# Patient Record
Sex: FEMALE | Race: WHITE | URBAN - NONMETROPOLITAN AREA
[De-identification: names, ages, dates, MRNs, and addresses within clinical notes are randomized per-mention and may not be internally consistent; named-entity substitution may affect disease eponyms.]

---

## 2023-02-10 ENCOUNTER — OFFICE VISIT (OUTPATIENT)
Dept: URGENT CARE | Facility: CLINIC | Age: 38
End: 2023-02-10
Payer: COMMERCIAL

## 2023-02-10 VITALS
BODY MASS INDEX: 21.22 KG/M2 | SYSTOLIC BLOOD PRESSURE: 128 MMHG | OXYGEN SATURATION: 97 % | WEIGHT: 140 LBS | TEMPERATURE: 97.5 F | HEART RATE: 107 BPM | HEIGHT: 68 IN | DIASTOLIC BLOOD PRESSURE: 80 MMHG | RESPIRATION RATE: 16 BRPM

## 2023-02-10 DIAGNOSIS — J01.90 ACUTE NON-RECURRENT SINUSITIS, UNSPECIFIED LOCATION: ICD-10-CM

## 2023-02-10 DIAGNOSIS — K11.20 PAROTITIS: ICD-10-CM

## 2023-02-10 PROCEDURE — 99203 OFFICE O/P NEW LOW 30 MIN: CPT | Performed by: NURSE PRACTITIONER

## 2023-02-10 RX ORDER — AMOXICILLIN AND CLAVULANATE POTASSIUM 875; 125 MG/1; MG/1
1 TABLET, FILM COATED ORAL 2 TIMES DAILY
Qty: 20 TABLET | Refills: 0 | Status: SHIPPED | OUTPATIENT
Start: 2023-02-10 | End: 2023-02-20

## 2023-02-10 RX ORDER — SPIRONOLACTONE 25 MG/1
75 TABLET ORAL DAILY
COMMUNITY
End: 2023-03-17

## 2023-02-10 RX ORDER — METHYLPREDNISOLONE 4 MG/1
TABLET ORAL
Qty: 1 EACH | Refills: 0 | Status: SHIPPED | OUTPATIENT
Start: 2023-02-10 | End: 2023-03-17

## 2023-02-10 ASSESSMENT — ENCOUNTER SYMPTOMS
NECK PAIN: 1
SINUS PRESSURE: 1
CONSTITUTIONAL NEGATIVE: 1
SINUS PAIN: 1
FEVER: 0
RESPIRATORY NEGATIVE: 1

## 2023-02-10 ASSESSMENT — VISUAL ACUITY: OU: 1

## 2023-02-10 NOTE — PROGRESS NOTES
"Subjective:     Karina Deleon is a 27 y.o. female who presents for Sinus Problem (X1mon POS infection, Neg covid test)       Sinus Problem  This is a new problem. Episode onset: 1 month ago. The problem has been gradually worsening since onset. Associated symptoms include congestion (Yellow nasal discharge), neck pain and sinus pressure. Treatments tried: Sinus rinses.     Tried Chloraseptic oral spray inside right cheek. Afterwards, experiencing swelling outside the cheek. Unsure if it was an allergic reaction. Took Benadryl; not improving.    Also reporting swollen lymph node at right neck. Hx of sinus issues.    Review of Systems   Constitutional: Negative.  Negative for fever and malaise/fatigue.   HENT:  Positive for congestion (Yellow nasal discharge), sinus pressure and sinus pain.    Respiratory: Negative.     Musculoskeletal:  Positive for neck pain.   All other systems reviewed and are negative.    Refer to HPI for additional details.    During this visit, appropriate PPE was worn, hand hygiene was performed, and the patient and any visitors were masked.    PMH:  has no past medical history on file.    MEDS:   Current Outpatient Medications:     spironolactone (ALDACTONE) 25 MG Tab, Take 75 mg by mouth every day., Disp: , Rfl:     amoxicillin-clavulanate (AUGMENTIN) 875-125 MG Tab, Take 1 Tablet by mouth 2 times a day for 10 days., Disp: 20 Tablet, Rfl: 0    methylPREDNISolone (MEDROL DOSEPAK) 4 MG Tablet Therapy Pack, Use as directed on package. May take all of Day 1 as a single dose (24 mg) when starting., Disp: 1 Each, Rfl: 0    ALLERGIES:   Allergies   Allergen Reactions    Seasonal      SURGHX: History reviewed. No pertinent surgical history.  SOCHX:      FH: Per HPI as applicable/pertinent.      Objective:     /80 (BP Location: Left arm, Patient Position: Sitting, BP Cuff Size: Adult)   Pulse (!) 107   Temp 36.4 °C (97.5 °F) (Temporal)   Resp 16   Ht 1.727 m (5' 8\")   Wt 63.5 kg (140 lb)  "  SpO2 97%   BMI 21.29 kg/m²     Physical Exam  Nursing note reviewed.   Constitutional:       General: She is not in acute distress.     Appearance: She is well-developed. She is not ill-appearing or toxic-appearing.   HENT:      Head:      Jaw: No trismus.        Nose: Congestion present.      Right Sinus: Maxillary sinus tenderness present.      Left Sinus: Maxillary sinus tenderness present.      Mouth/Throat:      Mouth: Mucous membranes are moist.      Pharynx: Oropharynx is clear.   Eyes:      General: Vision grossly intact.   Cardiovascular:      Rate and Rhythm: Tachycardia present.   Pulmonary:      Effort: Pulmonary effort is normal. No respiratory distress.      Comments: Phonation normal, speaks in full sentences, no audible wheeze or stridor   Musculoskeletal:         General: No deformity. Normal range of motion.   Lymphadenopathy:      Cervical: Cervical adenopathy (Right > left) present.   Skin:     General: Skin is warm and dry.      Coloration: Skin is not pale.   Neurological:      Mental Status: She is alert and oriented to person, place, and time.      Motor: No weakness.   Psychiatric:         Behavior: Behavior normal. Behavior is cooperative.       Assessment/Plan:     1. Acute non-recurrent sinusitis, unspecified location  - amoxicillin-clavulanate (AUGMENTIN) 875-125 MG Tab; Take 1 Tablet by mouth 2 times a day for 10 days.  Dispense: 20 Tablet; Refill: 0  - methylPREDNISolone (MEDROL DOSEPAK) 4 MG Tablet Therapy Pack; Use as directed on package. May take all of Day 1 as a single dose (24 mg) when starting.  Dispense: 1 Each; Refill: 0    2. Parotitis  - amoxicillin-clavulanate (AUGMENTIN) 875-125 MG Tab; Take 1 Tablet by mouth 2 times a day for 10 days.  Dispense: 20 Tablet; Refill: 0  - methylPREDNISolone (MEDROL DOSEPAK) 4 MG Tablet Therapy Pack; Use as directed on package. May take all of Day 1 as a single dose (24 mg) when starting.  Dispense: 1 Each; Refill: 0    Rx as above sent  electronically. Continue sinus rinse. OTC Flonase, antihistamine, APAP PRN. Warm/cool compress PRN.    Differential diagnosis, natural history, supportive care, over-the-counter symptom management per 's instructions, close monitoring, and indications for immediate follow-up discussed.     Vital signs stable, afebrile, no acute distress at this time. Warning signs reviewed. Return precautions discussed.     All questions answered. Patient agrees with the plan of care.

## 2023-03-09 ENCOUNTER — OFFICE VISIT (OUTPATIENT)
Dept: MEDICAL GROUP | Facility: PHYSICIAN GROUP | Age: 38
End: 2023-03-09
Payer: COMMERCIAL

## 2023-03-09 VITALS
HEART RATE: 82 BPM | WEIGHT: 139.33 LBS | SYSTOLIC BLOOD PRESSURE: 142 MMHG | HEIGHT: 68 IN | BODY MASS INDEX: 21.12 KG/M2 | OXYGEN SATURATION: 97 % | TEMPERATURE: 98 F | DIASTOLIC BLOOD PRESSURE: 106 MMHG | RESPIRATION RATE: 16 BRPM

## 2023-03-09 DIAGNOSIS — R09.81 SINUS CONGESTION: ICD-10-CM

## 2023-03-09 DIAGNOSIS — L70.9 ACNE, UNSPECIFIED ACNE TYPE: ICD-10-CM

## 2023-03-09 DIAGNOSIS — Z00.00 HEALTH CARE MAINTENANCE: ICD-10-CM

## 2023-03-09 DIAGNOSIS — I10 PRIMARY HYPERTENSION: ICD-10-CM

## 2023-03-09 DIAGNOSIS — N92.6 IRREGULAR MENSTRUAL CYCLE: ICD-10-CM

## 2023-03-09 DIAGNOSIS — F17.200 SMOKER: ICD-10-CM

## 2023-03-09 PROCEDURE — 99214 OFFICE O/P EST MOD 30 MIN: CPT | Performed by: STUDENT IN AN ORGANIZED HEALTH CARE EDUCATION/TRAINING PROGRAM

## 2023-03-09 RX ORDER — OXYMETAZOLINE HYDROCHLORIDE 0.05 G/100ML
2 SPRAY NASAL 2 TIMES DAILY
Qty: 1.2 ML | Refills: 0 | Status: SHIPPED | OUTPATIENT
Start: 2023-03-09 | End: 2023-03-12

## 2023-03-09 RX ORDER — SPIRONOLACTONE 25 MG/1
75 TABLET ORAL DAILY
Qty: 90 TABLET | Refills: 1 | Status: SHIPPED | OUTPATIENT
Start: 2023-03-09 | End: 2023-05-08

## 2023-03-09 RX ORDER — METHYLPREDNISOLONE 4 MG/1
TABLET ORAL
COMMUNITY
Start: 2023-02-10 | End: 2023-03-09

## 2023-03-09 RX ORDER — AMOXICILLIN AND CLAVULANATE POTASSIUM 875; 125 MG/1; MG/1
TABLET, FILM COATED ORAL
COMMUNITY
Start: 2023-02-10 | End: 2023-03-09

## 2023-03-09 ASSESSMENT — PATIENT HEALTH QUESTIONNAIRE - PHQ9: CLINICAL INTERPRETATION OF PHQ2 SCORE: 0

## 2023-03-09 NOTE — ASSESSMENT & PLAN NOTE
Patient reports she is not motivated yet to quit smoking.  She has tried Chantix in the past which did help the cravings but had given her mental side effects.  Advised to return to care if she would like to discuss treatment options.

## 2023-03-09 NOTE — ASSESSMENT & PLAN NOTE
Advised to check blood pressures daily twice a day and return to care next week  Continue spironolactone 75 mg daily, refilled  Labs ordered  EKG at next visit    Chronic condition not well controlled, studies ordered

## 2023-03-09 NOTE — PROGRESS NOTES
Subjective:   HISTORY OF THE PRESENT ILLNESS: Patient is a 37 y.o. female here today to establish care. His/her prior PCP was none.    Problem   Primary Hypertension    Patient reports a history of high blood pressure but never a formal diagnosis.  She is never taken medications for this in the past.  She takes spironolactone for acne and irregular menstrual cycles    She denies any chest pain, dyspnea exertion, palpitations, dizziness, edema, snoring     Smoker    25 years of 0.5 pack a day. Had chantix in the past but had side effects     Irregular Menstrual Cycle    Patient has history of irregular menstrual cycles, was given spironolactone by a online medical group physician which has now stabilized her cycles and has controlled her acne.     Health Care Maintenance   Sinus Congestion        Current Outpatient Medications Ordered in Epic   Medication Sig Dispense Refill    spironolactone (ALDACTONE) 25 MG Tab Take 3 Tablets by mouth every day. 90 Tablet 1    oxymetazoline (AFRIN NASAL SPRAY) 0.05 % Solution Administer 2 Sprays into affected nostril(S) 2 times a day for 3 days. 1.2 mL 0     No current Epic-ordered facility-administered medications on file.       Review of systems:  Per HPI    No past medical history on file.  No past surgical history on file.  Social History     Tobacco Use    Smoking status: Every Day     Packs/day: 0.50     Years: 25.00     Pack years: 12.50     Types: Cigarettes    Smokeless tobacco: Never   Vaping Use    Vaping Use: Never used   Substance Use Topics    Alcohol use: Yes     Alcohol/week: 3.0 - 3.6 oz     Types: 5 - 6 Standard drinks or equivalent per week    Drug use: Never      Family History   Problem Relation Age of Onset    Hypertension Mother     Heart Disease Father     Heart Disease Maternal Grandmother     Heart Disease Maternal Grandfather      Current Outpatient Medications   Medication Sig Dispense Refill    spironolactone (ALDACTONE) 25 MG Tab Take 3 Tablets by  "mouth every day. 90 Tablet 1    oxymetazoline (AFRIN NASAL SPRAY) 0.05 % Solution Administer 2 Sprays into affected nostril(S) 2 times a day for 3 days. 1.2 mL 0     No current facility-administered medications for this visit.       Allergies:  No Known Allergies    Allergies, past medical history, past surgical history, family history, social history reviewed and updated.    Objective:    BP (!) 142/106 (BP Location: Left arm, Patient Position: Sitting, BP Cuff Size: Adult)   Pulse 82   Temp 36.7 °C (98 °F) (Temporal)   Resp 16   Ht 1.727 m (5' 8\")   Wt 63.2 kg (139 lb 5.3 oz)   LMP 03/07/2023   SpO2 97%   BMI 21.19 kg/m²    Body mass index is 21.19 kg/m².    Physical exam:  General: Normal appearance, no acute distress, not ill-appearing  HEENT: EOM intact, conjunctiva normal limits, negative right/left eye discharge.  Sclera anicteric.  TM bilateral with bubbles behind the membranes, no erythema or discharge, gums slightly erythematous, poor dentition.  Cardiovascular: Normal rate and rhythm, no murmurs  Pulmonary: No respiratory distress, no wheezing, no rales, breath sounds normal.  Abdomen: Bowel sounds normal, flat, soft.  Musculoskeletal: No edema bilaterally  Skin: Warm, dry, no lesions  Neurological: No focal deficits, normal gait  Psychiatric: Mood within normal limits    Assessment/Plan:      Problem List Items Addressed This Visit       Primary hypertension     Advised to check blood pressures daily twice a day and return to care next week  Continue spironolactone 75 mg daily, refilled  Labs ordered  EKG at next visit    Chronic condition not well controlled, studies ordered         Relevant Medications    spironolactone (ALDACTONE) 25 MG Tab    Other Relevant Orders    Comp Metabolic Panel    CBC WITHOUT DIFFERENTIAL    TSH WITH REFLEX TO FT4    MICROALBUMIN CREAT RATIO URINE    Smoker     Patient reports she is not motivated yet to quit smoking.  She has tried Chantix in the past which did " help the cravings but had given her mental side effects.  Advised to return to care if she would like to discuss treatment options.         Relevant Orders    Lipid Profile    Irregular menstrual cycle     Continue spironolactone which has the added side effect of helping her blood pressure.  Patient reports that she is not on any birth control and is sexually active, reports her  will be getting a vasectomy and does not want to discuss birth control options         Health care maintenance     Patient here for a preventive medicine visit today and to establish care.  -Reviewed all past medical history, family history, social history    -Diet and exercise appropriate counseling given  -Social determinants of health reviewed  -Tobacco, alcohol, recreational drug use: discussed cigarette use.   -Cholesterol screening: Ordered  -Diabetes screening: Not indicated  -AAA Screening: Not indicated  - DEXA scan: Not indicated    Immunizations/Infectious disease:  STI screening: declines  Safe sex practices discusssed  HIV screening: delcines  Immunizations: Declines flu, tetanus, had COVID booster.  Discussed pneumonia vaccine at next visit    Cancer screenings:  Colorectal cancer screening: no fam hx of colon cancer  Cervical Cancer Screening: needs to be scheluded   Breast Cancer Screening:          ----BRCA SCREENING: FHS-7 score: no fam hx of breat or ovarian cancer    Ob-Gyn/ History:   /Para:   Hx of abnormal Pap smears: none  Gyn Surgery: No  Current Contraceptive Method: no birth control  Cycles are regular with spironolactone.          Sinus congestion     Month patient went to the urgent care for a sinus infection.  She received prednisone and Augmentin with improvement in her symptoms.  Since then she is noted that she is still having right-sided facial pain but much reduced swelling.  On exam it seems that her gums are somewhat erythematous and irritated.  Tympanic membrane show bubbles behind  the membranes bilaterally.  She may be still having some residual congestion however advised her to go see a dentist in order to investigate for periodontal disease.  Rx Afrin for decongestion, continue Esthela pot sinus irrigation.         Relevant Medications    oxymetazoline (AFRIN NASAL SPRAY) 0.05 % Solution     Other Visit Diagnoses       Acne, unspecified acne type        Relevant Medications    spironolactone (ALDACTONE) 25 MG Tab           Return in about 1 week (around 3/16/2023) for blood pressure.

## 2023-03-09 NOTE — ASSESSMENT & PLAN NOTE
Month patient went to the urgent care for a sinus infection.  She received prednisone and Augmentin with improvement in her symptoms.  Since then she is noted that she is still having right-sided facial pain but much reduced swelling.  On exam it seems that her gums are somewhat erythematous and irritated.  Tympanic membrane show bubbles behind the membranes bilaterally.  She may be still having some residual congestion however advised her to go see a dentist in order to investigate for periodontal disease.  Rx Afrin for decongestion, continue Esthela pot sinus irrigation.

## 2023-03-09 NOTE — ASSESSMENT & PLAN NOTE
Patient here for a preventive medicine visit today and to establish care.  -Reviewed all past medical history, family history, social history    -Diet and exercise appropriate counseling given  -Social determinants of health reviewed  -Tobacco, alcohol, recreational drug use: discussed cigarette use.   -Cholesterol screening: Ordered  -Diabetes screening: Not indicated  -AAA Screening: Not indicated  - DEXA scan: Not indicated    Immunizations/Infectious disease:  STI screening: declines  Safe sex practices discusssed  HIV screening: delcines  Immunizations: Declines flu, tetanus, had COVID booster.  Discussed pneumonia vaccine at next visit    Cancer screenings:  Colorectal cancer screening: no fam hx of colon cancer  Cervical Cancer Screening: needs to be scheluded   Breast Cancer Screening:          ----BRCA SCREENING: FHS-7 score: no fam hx of breat or ovarian cancer    Ob-Gyn/ History:   /Para:   Hx of abnormal Pap smears: none  Gyn Surgery: No  Current Contraceptive Method: no birth control  Cycles are regular with spironolactone.

## 2023-03-09 NOTE — ASSESSMENT & PLAN NOTE
Continue spironolactone which has the added side effect of helping her blood pressure.  Patient reports that she is not on any birth control and is sexually active, reports her  will be getting a vasectomy and does not want to discuss birth control options

## 2023-03-17 ENCOUNTER — OFFICE VISIT (OUTPATIENT)
Dept: MEDICAL GROUP | Facility: PHYSICIAN GROUP | Age: 38
End: 2023-03-17
Payer: COMMERCIAL

## 2023-03-17 VITALS
BODY MASS INDEX: 21.28 KG/M2 | DIASTOLIC BLOOD PRESSURE: 78 MMHG | HEIGHT: 68 IN | SYSTOLIC BLOOD PRESSURE: 138 MMHG | HEART RATE: 95 BPM | TEMPERATURE: 96.8 F | WEIGHT: 140.43 LBS | OXYGEN SATURATION: 97 %

## 2023-03-17 DIAGNOSIS — I10 PRIMARY HYPERTENSION: ICD-10-CM

## 2023-03-17 PROCEDURE — 93000 ELECTROCARDIOGRAM COMPLETE: CPT | Performed by: STUDENT IN AN ORGANIZED HEALTH CARE EDUCATION/TRAINING PROGRAM

## 2023-03-17 PROCEDURE — 99214 OFFICE O/P EST MOD 30 MIN: CPT | Performed by: STUDENT IN AN ORGANIZED HEALTH CARE EDUCATION/TRAINING PROGRAM

## 2023-03-17 RX ORDER — AMLODIPINE BESYLATE 5 MG/1
5 TABLET ORAL DAILY
Qty: 30 TABLET | Refills: 3 | Status: SHIPPED | OUTPATIENT
Start: 2023-03-17 | End: 2023-04-28

## 2023-03-17 NOTE — PROGRESS NOTES
HISTORY OF PRESENT ILLNESS: Karina is a pleasant 37 y.o. female, established patient who presents today to discuss medical problems as listed below:    #Hypertension  Patient was seen last visit for her high blood pressure.  She was started on her previous medications spironolactone at 75 mg.  She comes in today with a blood pressure log showing improved blood pressures however the diastolic numbers still in the 90s the majority of the time.  Denies any chest pain, dyspnea exertion, edema, palpitations, orthopnea.  She otherwise feels well    Current Outpatient Medications Ordered in Epic   Medication Sig Dispense Refill    amLODIPine (NORVASC) 5 MG Tab Take 1 Tablet by mouth every day. 30 Tablet 3    spironolactone (ALDACTONE) 25 MG Tab Take 3 Tablets by mouth every day. 90 Tablet 1     No current Epic-ordered facility-administered medications on file.       Review of systems:  Per HPI    History reviewed. No pertinent past medical history.  History reviewed. No pertinent surgical history.  Social History     Tobacco Use    Smoking status: Every Day     Packs/day: 0.50     Years: 25.00     Pack years: 12.50     Types: Cigarettes    Smokeless tobacco: Never   Vaping Use    Vaping Use: Never used   Substance Use Topics    Alcohol use: Yes     Alcohol/week: 3.0 - 3.6 oz     Types: 5 - 6 Standard drinks or equivalent per week    Drug use: Never      Family History   Problem Relation Age of Onset    Hypertension Mother     Heart Disease Father     Heart Disease Maternal Grandmother     Heart Disease Maternal Grandfather      Current Outpatient Medications   Medication Sig Dispense Refill    amLODIPine (NORVASC) 5 MG Tab Take 1 Tablet by mouth every day. 30 Tablet 3    spironolactone (ALDACTONE) 25 MG Tab Take 3 Tablets by mouth every day. 90 Tablet 1     No current facility-administered medications for this visit.       Allergies:  Allergies   Allergen Reactions    Seasonal        Allergies, past medical history,  "past surgical history, family history, social history reviewed and updated.    Objective:    /78 (BP Location: Right arm, Patient Position: Sitting, BP Cuff Size: Adult)   Pulse 95   Temp 36 °C (96.8 °F) (Temporal)   Ht 1.727 m (5' 8\")   Wt 63.7 kg (140 lb 6.9 oz)   LMP 03/07/2023   SpO2 97%   BMI 21.35 kg/m²    Body mass index is 21.35 kg/m².    Physical exam:  General: Normal appearance, no acute distress, not ill-appearing  HEENT: EOM intact, conjunctiva normal limits, negative right/left eye discharge.  Sclera anicteric  Cardiovascular: Normal rate and rhythm, no murmurs  Pulmonary: No respiratory distress, no wheezing, no rales, breath sounds normal.  Abdomen: Bowel sounds normal, flat, soft.  Musculoskeletal: No edema bilaterally  Skin: Warm, dry, no lesions  Neurological: No focal deficits, normal gait  Psychiatric: Mood within normal limits    Assessment/Plan:    Problem List Items Addressed This Visit       Primary hypertension     Blood pressures are still not well controlled of the diastolics are in the  range.  Currently on spironolactone 25 mg daily.  We will add a amlodipine 5 mg tablet.  Advised on side effects.  Chronic condition not well controlled with medical management    Baseline EKG done today: Normal sinus rate and rhythm, normal axis, no ST changes, no T wave inversions, intervals within normal limits.    Return to care 4 weeks         Relevant Medications    amLODIPine (NORVASC) 5 MG Tab    Other Relevant Orders    EKG - Clinic Performed (Completed)        Return in about 4 weeks (around 4/14/2023) for blood pressure.     "

## 2023-03-17 NOTE — ASSESSMENT & PLAN NOTE
Blood pressures are still not well controlled of the diastolics are in the  range.  Currently on spironolactone 25 mg daily.  We will add a amlodipine 5 mg tablet.  Advised on side effects.  Chronic condition not well controlled with medical management    Baseline EKG done today: Normal sinus rate and rhythm, normal axis, no ST changes, no T wave inversions, intervals within normal limits.    Return to care 4 weeks

## 2023-04-21 ENCOUNTER — OFFICE VISIT (OUTPATIENT)
Dept: MEDICAL GROUP | Facility: PHYSICIAN GROUP | Age: 38
End: 2023-04-21
Payer: COMMERCIAL

## 2023-04-21 ENCOUNTER — HOSPITAL ENCOUNTER (OUTPATIENT)
Facility: MEDICAL CENTER | Age: 38
End: 2023-04-21
Attending: NURSE PRACTITIONER
Payer: COMMERCIAL

## 2023-04-21 VITALS
OXYGEN SATURATION: 96 % | DIASTOLIC BLOOD PRESSURE: 80 MMHG | HEART RATE: 97 BPM | RESPIRATION RATE: 16 BRPM | BODY MASS INDEX: 20.92 KG/M2 | SYSTOLIC BLOOD PRESSURE: 124 MMHG | HEIGHT: 68 IN | WEIGHT: 138 LBS | TEMPERATURE: 97 F

## 2023-04-21 DIAGNOSIS — Z11.51 SCREENING FOR HPV (HUMAN PAPILLOMAVIRUS): ICD-10-CM

## 2023-04-21 DIAGNOSIS — Z12.4 SCREENING FOR CERVICAL CANCER: ICD-10-CM

## 2023-04-21 DIAGNOSIS — Z01.419 ENCOUNTER FOR GYNECOLOGICAL EXAMINATION: ICD-10-CM

## 2023-04-21 PROCEDURE — 87624 HPV HI-RISK TYP POOLED RSLT: CPT

## 2023-04-21 PROCEDURE — 99000 SPECIMEN HANDLING OFFICE-LAB: CPT | Performed by: NURSE PRACTITIONER

## 2023-04-21 PROCEDURE — 99395 PREV VISIT EST AGE 18-39: CPT | Performed by: NURSE PRACTITIONER

## 2023-04-21 PROCEDURE — 88175 CYTOPATH C/V AUTO FLUID REDO: CPT

## 2023-04-21 NOTE — PROGRESS NOTES
Subjective:   S:  Karina Deleon is a 37 y.o.,female who presents today for her annual Pap and GYN exam.  She is feeling well and denies any complaints.    A chaperone was offered to the patient during today's exam. Patient declined chaperone.    Ob-Gyn/ History:    Patient has GYN provider: no  /Para:  0/0  Last Pap Smear:  12-13 years ago .   Possible abnormal history of abnormal pap smears.  Treatment for abnormal Pap smear: none  Gyn Surgery:  None .  Current Contraceptive Method:  None.   IS currently sexually active.  Last menstrual period:  2023.    She is still having regular menses.  Periods regular. moderate bleeding.   Cramping is variable.   She does not take OTC analgesics for cramps.  No significant bloating/fluid retention, pelvic pain, or dyspareunia.   No vaginal discharge  Post-menopausal bleeding: NA  Urinary incontinence: None  Folate intake: None       Cancer screening  Colorectal Cancer Screening: NA    Lung Cancer Screening:  Too Young  Cervical Cancer Screening: completed today    Breast Cancer Screening: NA     Infectious disease screening/Immunizations  --STI Screening: Not interested   --Practices safe sex.  --HIV Screening: Not interested   --Hepatitis C Screening: Not interested     Her preventative health screens are up to date.        Patient Active Problem List    Diagnosis Date Noted    Primary hypertension 2023    Smoker 2023    Irregular menstrual cycle 2023    Health care maintenance 2023    Sinus congestion 2023       Current Outpatient Medications on File Prior to Visit   Medication Sig Dispense Refill    amLODIPine (NORVASC) 5 MG Tab Take 1 Tablet by mouth every day. 30 Tablet 3    spironolactone (ALDACTONE) 25 MG Tab Take 3 Tablets by mouth every day. 90 Tablet 1     No current facility-administered medications on file prior to visit.       Social History     Tobacco Use    Smoking status: Every Day     Packs/day: 0.50     Years:  25.00     Pack years: 12.50     Types: Cigarettes    Smokeless tobacco: Never   Substance Use Topics    Alcohol use: Yes     Alcohol/week: 3.0 - 3.6 oz     Types: 5 - 6 Standard drinks or equivalent per week       She  has no past medical history on file.    She  has no past surgical history on file.    Family History   Problem Relation Age of Onset    Hypertension Mother     Heart Disease Father     Heart Disease Maternal Grandmother     Heart Disease Maternal Grandfather        Social History     Socioeconomic History    Marital status:      Spouse name: Not on file    Number of children: Not on file    Years of education: Not on file    Highest education level: Not on file   Occupational History    Not on file   Tobacco Use    Smoking status: Every Day     Packs/day: 0.50     Years: 25.00     Pack years: 12.50     Types: Cigarettes    Smokeless tobacco: Never   Vaping Use    Vaping Use: Never used   Substance and Sexual Activity    Alcohol use: Yes     Alcohol/week: 3.0 - 3.6 oz     Types: 5 - 6 Standard drinks or equivalent per week    Drug use: Never    Sexual activity: Yes     Partners: Male     Birth control/protection: None   Other Topics Concern    Not on file   Social History Narrative    ** Merged History Encounter **          Social Determinants of Health     Financial Resource Strain: Not on file   Food Insecurity: Not on file   Transportation Needs: Not on file   Physical Activity: Not on file   Stress: Not on file   Social Connections: Not on file   Intimate Partner Violence: Not on file   Housing Stability: Not on file       Current Outpatient Medications   Medication Sig Dispense Refill    amLODIPine (NORVASC) 5 MG Tab Take 1 Tablet by mouth every day. 30 Tablet 3    spironolactone (ALDACTONE) 25 MG Tab Take 3 Tablets by mouth every day. 90 Tablet 1     No current facility-administered medications for this visit.       Allergies   Allergen Reactions    Seasonal        GYN ROS:  normal menses,  "no abnormal bleeding, pelvic pain or discharge, no breast pain or new or enlarging lumps on self exam      Objective:     /80 (BP Location: Left arm, Patient Position: Sitting, BP Cuff Size: Adult)   Pulse 97   Temp 36.1 °C (97 °F) (Temporal)   Resp 16   Ht 1.727 m (5' 8\")   Wt 62.6 kg (138 lb)   LMP 04/08/2023 (Exact Date)   SpO2 96%   BMI 20.98 kg/m²   Body mass index is 20.98 kg/m².  Wt Readings from Last 4 Encounters:   04/21/23 62.6 kg (138 lb)   03/17/23 63.7 kg (140 lb 6.9 oz)   03/09/23 63.2 kg (139 lb 5.3 oz)   02/10/23 63.5 kg (140 lb)       Physical Exam   Breasts:  Patient deferred  External Genitalia: general appearance, hair distrubution, no lesions noted  Vulva: grossly unremarkable, no lesions or masses noted  Vagina: no abnormal discharge, normal color  Cervix: NonParous, nonfriable, no surface lesions identified, Pap was performed  Uterus: Normal shape, position and consistency, Non tender   Bladder: empty  Bimanual exam: No uteromegaly, negative chandelier sign, adnexa freely movable and without enlargements bilaterally  Rectal: not performed        Assessment and Plan:   Assessment:  NormalGYN Exam      1. Screening for cervical cancer  - Thinprep Pap with HPV; Future    2. Encounter for gynecological examination  - Thinprep Pap with HPV; Future    3. Screening for HPV (human papillomavirus)  - Thinprep Pap with HPV; Future       Plan:   pap smear  return annually or prn  Teaching completed for perineal hygiene and prevention of infection.  Wash hands before and after genital contact and after using restroom, wipe front to back.  Avoid products that can disturb normal vaginal abigail such as douching.   Do not use feminine hygiene sprays, deodorants, gels, powders or scented tampons or pads.    Pap processed and sent to the lab.      Follow-up: Return in about 1 week (around 4/28/2023) for follow up with PCP.          "

## 2023-04-22 DIAGNOSIS — Z01.419 ENCOUNTER FOR GYNECOLOGICAL EXAMINATION: ICD-10-CM

## 2023-04-22 DIAGNOSIS — Z12.4 SCREENING FOR CERVICAL CANCER: ICD-10-CM

## 2023-04-22 DIAGNOSIS — Z11.51 SCREENING FOR HPV (HUMAN PAPILLOMAVIRUS): ICD-10-CM

## 2023-04-24 DIAGNOSIS — R87.613 HIGH GRADE SQUAMOUS INTRAEPITHELIAL LESION (HGSIL) ON CYTOLOGIC SMEAR OF CERVIX: ICD-10-CM

## 2023-04-24 DIAGNOSIS — R87.810 CERVICAL HIGH RISK HPV (HUMAN PAPILLOMAVIRUS) TEST POSITIVE: ICD-10-CM

## 2023-04-24 LAB
CYTOLOGY REG CYTOL: ABNORMAL
HPV HR 12 DNA CVX QL NAA+PROBE: POSITIVE
HPV16 DNA SPEC QL NAA+PROBE: POSITIVE
HPV18 DNA SPEC QL NAA+PROBE: NEGATIVE
SPECIMEN SOURCE: ABNORMAL

## 2023-04-25 NOTE — PROGRESS NOTES
Results reviewed from pap smear- phone call placed to patient. Patient did not answer, Left message for patient to call back as there is no MyChart. Will also update pcp.     New referral placed to OBgyn.

## 2023-04-28 ENCOUNTER — OFFICE VISIT (OUTPATIENT)
Dept: MEDICAL GROUP | Facility: PHYSICIAN GROUP | Age: 38
End: 2023-04-28
Payer: COMMERCIAL

## 2023-04-28 VITALS
HEIGHT: 68 IN | TEMPERATURE: 97.1 F | OXYGEN SATURATION: 97 % | DIASTOLIC BLOOD PRESSURE: 76 MMHG | SYSTOLIC BLOOD PRESSURE: 118 MMHG | BODY MASS INDEX: 20.38 KG/M2 | HEART RATE: 105 BPM | RESPIRATION RATE: 16 BRPM | WEIGHT: 134.48 LBS

## 2023-04-28 DIAGNOSIS — I10 PRIMARY HYPERTENSION: ICD-10-CM

## 2023-04-28 PROCEDURE — 99213 OFFICE O/P EST LOW 20 MIN: CPT | Performed by: STUDENT IN AN ORGANIZED HEALTH CARE EDUCATION/TRAINING PROGRAM

## 2023-04-28 RX ORDER — METOPROLOL SUCCINATE 25 MG/1
25 TABLET, EXTENDED RELEASE ORAL DAILY
Qty: 30 TABLET | Refills: 3 | Status: SHIPPED | OUTPATIENT
Start: 2023-04-28 | End: 2023-07-21

## 2023-04-28 NOTE — PROGRESS NOTES
HISTORY OF PRESENT ILLNESS: Karina is a pleasant 37 y.o. female, established patient who presents today to discuss medical problems as listed below:    Problem   Primary Hypertension    Was started on amlodipine 5 mg daily at last visit in addition to her already spironolactone 25 mg daily.  Blood pressures at home are running high in the 90s to 100 and diastolic, heart rate in the 100-1 25 range.    Denies any dizziness, palpitations, chest pain, dyspnea on exertion, edema.          Current Outpatient Medications Ordered in Epic   Medication Sig Dispense Refill    metoprolol SR (TOPROL XL) 25 MG TABLET SR 24 HR Take 1 Tablet by mouth every day. 30 Tablet 3    spironolactone (ALDACTONE) 25 MG Tab Take 3 Tablets by mouth every day. 90 Tablet 1     No current Epic-ordered facility-administered medications on file.       Review of systems:  Per HPI    History reviewed. No pertinent past medical history.  History reviewed. No pertinent surgical history.  Social History     Tobacco Use    Smoking status: Every Day     Packs/day: 0.50     Years: 25.00     Pack years: 12.50     Types: Cigarettes    Smokeless tobacco: Never   Vaping Use    Vaping Use: Never used   Substance Use Topics    Alcohol use: Yes     Alcohol/week: 3.0 - 3.6 oz     Types: 5 - 6 Standard drinks or equivalent per week    Drug use: Never      Family History   Problem Relation Age of Onset    Hypertension Mother     Heart Disease Father     Heart Disease Maternal Grandmother     Heart Disease Maternal Grandfather      Current Outpatient Medications   Medication Sig Dispense Refill    metoprolol SR (TOPROL XL) 25 MG TABLET SR 24 HR Take 1 Tablet by mouth every day. 30 Tablet 3    spironolactone (ALDACTONE) 25 MG Tab Take 3 Tablets by mouth every day. 90 Tablet 1     No current facility-administered medications for this visit.       Allergies:  Allergies   Allergen Reactions    Seasonal        Allergies, past medical history, past surgical history,  "family history, social history reviewed and updated.    Objective:    /76 (BP Location: Left arm, Patient Position: Sitting, BP Cuff Size: Adult)   Pulse (!) 105   Temp 36.2 °C (97.1 °F) (Temporal)   Resp 16   Ht 1.727 m (5' 8\")   Wt 61 kg (134 lb 7.7 oz)   LMP 04/08/2023 (Exact Date)   SpO2 97%   BMI 20.45 kg/m²    Body mass index is 20.45 kg/m².    Physical exam:  General: Normal appearance, no acute distress, not ill-appearing  HEENT: EOM intact, conjunctiva normal limits, negative right/left eye discharge.  Sclera anicteric  Cardiovascular: Normal rate and rhythm, no murmurs  Pulmonary: No respiratory distress, no wheezing, no rales, breath sounds normal.  Musculoskeletal: No edema bilaterally  Skin: Warm, dry, no lesions  Neurological: No focal deficits, normal gait  Psychiatric: Mood within normal limits    Assessment/Plan:    Problem List Items Addressed This Visit       Primary hypertension     Blood pressure today 118/76, I suspect her blood pressure machine at home is not calibrated correctly and running high.  Due to her increasing heart rate will discontinue amlodipine and start metoprolol 25 mg daily as a substitution.  She is to get a new blood pressure machine and log her blood pressures and return to care in 3 months for a recheck or earlier if needed is a chronic condition, currently at goal with medication management         Relevant Medications    metoprolol SR (TOPROL XL) 25 MG TABLET SR 24 HR        Return in about 3 months (around 7/28/2023) for blood pressure.      "

## 2023-04-28 NOTE — ASSESSMENT & PLAN NOTE
Blood pressure today 118/76, I suspect her blood pressure machine at home is not calibrated correctly and running high.  Due to her increasing heart rate will discontinue amlodipine and start metoprolol 25 mg daily as a substitution.  She is to get a new blood pressure machine and log her blood pressures and return to care in 3 months for a recheck or earlier if needed is a chronic condition, currently at goal with medication management

## 2023-05-08 DIAGNOSIS — I10 PRIMARY HYPERTENSION: ICD-10-CM

## 2023-05-08 DIAGNOSIS — L70.9 ACNE, UNSPECIFIED ACNE TYPE: ICD-10-CM

## 2023-05-08 RX ORDER — SPIRONOLACTONE 25 MG/1
75 TABLET ORAL DAILY
Qty: 90 TABLET | Refills: 1 | Status: SHIPPED | OUTPATIENT
Start: 2023-05-08 | End: 2023-07-14

## 2023-07-14 DIAGNOSIS — L70.9 ACNE, UNSPECIFIED ACNE TYPE: ICD-10-CM

## 2023-07-14 DIAGNOSIS — I10 PRIMARY HYPERTENSION: ICD-10-CM

## 2023-07-14 RX ORDER — SPIRONOLACTONE 25 MG/1
75 TABLET ORAL DAILY
Qty: 90 TABLET | Refills: 1 | Status: SHIPPED | OUTPATIENT
Start: 2023-07-14 | End: 2023-08-17

## 2023-07-21 ENCOUNTER — OFFICE VISIT (OUTPATIENT)
Dept: MEDICAL GROUP | Facility: PHYSICIAN GROUP | Age: 38
End: 2023-07-21
Payer: COMMERCIAL

## 2023-07-21 VITALS
RESPIRATION RATE: 16 BRPM | BODY MASS INDEX: 21.72 KG/M2 | HEART RATE: 106 BPM | HEIGHT: 68 IN | WEIGHT: 143.3 LBS | SYSTOLIC BLOOD PRESSURE: 124 MMHG | OXYGEN SATURATION: 96 % | DIASTOLIC BLOOD PRESSURE: 76 MMHG | TEMPERATURE: 96.6 F

## 2023-07-21 DIAGNOSIS — F17.200 SMOKER: ICD-10-CM

## 2023-07-21 DIAGNOSIS — I10 PRIMARY HYPERTENSION: ICD-10-CM

## 2023-07-21 PROCEDURE — 3074F SYST BP LT 130 MM HG: CPT | Performed by: STUDENT IN AN ORGANIZED HEALTH CARE EDUCATION/TRAINING PROGRAM

## 2023-07-21 PROCEDURE — 3078F DIAST BP <80 MM HG: CPT | Performed by: STUDENT IN AN ORGANIZED HEALTH CARE EDUCATION/TRAINING PROGRAM

## 2023-07-21 PROCEDURE — 99214 OFFICE O/P EST MOD 30 MIN: CPT | Performed by: STUDENT IN AN ORGANIZED HEALTH CARE EDUCATION/TRAINING PROGRAM

## 2023-07-21 RX ORDER — BUPROPION HYDROCHLORIDE 150 MG/1
150 TABLET, EXTENDED RELEASE ORAL 2 TIMES DAILY
Qty: 120 TABLET | Refills: 0 | Status: SHIPPED | OUTPATIENT
Start: 2023-07-21 | End: 2023-09-01

## 2023-07-21 RX ORDER — METOPROLOL SUCCINATE 25 MG/1
25 TABLET, EXTENDED RELEASE ORAL DAILY
Qty: 90 TABLET | Refills: 1 | Status: SHIPPED | OUTPATIENT
Start: 2023-07-21 | End: 2024-01-30

## 2023-07-21 RX ORDER — BUPROPION HYDROCHLORIDE 150 MG/1
TABLET, EXTENDED RELEASE ORAL
Qty: 123 TABLET | Refills: 0 | Status: SHIPPED | OUTPATIENT
Start: 2023-07-21 | End: 2023-09-01

## 2023-07-21 NOTE — ASSESSMENT & PLAN NOTE
We will stick with current regimen metoprolol 100 mg daily, Aldactone 25 mg daily no blood pressures at home seem to be borderline high range.    I suggest we work on smoking cessation, she is open to trying Wellbutrin    Return to care 6 weeks for blood pressure check with machine to calibrate with ours

## 2023-07-21 NOTE — ASSESSMENT & PLAN NOTE
Rx Wellbutrin  Reports that Chantix had significant side effects in the past, nicotine patches never work for her

## 2023-07-21 NOTE — TELEPHONE ENCOUNTER
Received request via: Patient    Was the patient seen in the last year in this department? Yes    Does the patient have an active prescription (recently filled or refills available) for medication(s) requested? No    Does the patient have [facility] and need 100 day supply (blood pressure, diabetes and cholesterol meds only)? Patient does not have SCP

## 2023-07-21 NOTE — PROGRESS NOTES
HISTORY OF PRESENT ILLNESS: Karina is a pleasant 37 y.o. female, established patient who presents today to discuss medical problems as listed below:    Problem   Primary Hypertension    Currently on metoprolol 25 mg daily, Aldactone 25 mg daily.  Home blood pressures show diastolic in the 120s to 130/80-90 range.  She denies any chest pain, dyspnea on exertion, palpitations, edema.  She reports that she is under a high amount of stress due to needing a hysterectomy after her LEEP procedure showed some abnormalities.  She is also still smoking and would like to quit as she thinks it would help her blood pressures well     Smoker    25 years of 0.5 pack a day. Had chantix in the past but had side effects          Current Outpatient Medications Ordered in Epic   Medication Sig Dispense Refill    buPROPion SR (WELLBUTRIN SR) 150 MG TABLET SR 12 HR sustained-release tablet Take 1 Tablet by mouth every day for 3 days, THEN 1 Tablet 2 times a day for 60 days. 123 Tablet 0    buPROPion SR (WELLBUTRIN-SR) 150 MG TABLET SR 12 HR sustained-release tablet Take 1 Tablet by mouth 2 times a day for 60 days. 120 Tablet 0    metoprolol SR (TOPROL XL) 25 MG TABLET SR 24 HR TAKE 1 TABLET BY MOUTH EVERY DAY 90 Tablet 1    spironolactone (ALDACTONE) 25 MG Tab TAKE 3 TABLETS BY MOUTH EVERY DAY 90 Tablet 1     No current Epic-ordered facility-administered medications on file.       Review of systems:  Per HPI    History reviewed. No pertinent past medical history.  History reviewed. No pertinent surgical history.  Social History     Tobacco Use    Smoking status: Every Day     Packs/day: 0.50     Years: 25.00     Pack years: 12.50     Types: Cigarettes    Smokeless tobacco: Never   Vaping Use    Vaping Use: Never used   Substance Use Topics    Alcohol use: Yes     Alcohol/week: 3.0 - 3.6 oz     Types: 5 - 6 Standard drinks or equivalent per week    Drug use: Never      Family History   Problem Relation Age of Onset    Hypertension  "Mother     Heart Disease Father     Heart Disease Maternal Grandmother     Heart Disease Maternal Grandfather      Current Outpatient Medications   Medication Sig Dispense Refill    buPROPion SR (WELLBUTRIN SR) 150 MG TABLET SR 12 HR sustained-release tablet Take 1 Tablet by mouth every day for 3 days, THEN 1 Tablet 2 times a day for 60 days. 123 Tablet 0    buPROPion SR (WELLBUTRIN-SR) 150 MG TABLET SR 12 HR sustained-release tablet Take 1 Tablet by mouth 2 times a day for 60 days. 120 Tablet 0    metoprolol SR (TOPROL XL) 25 MG TABLET SR 24 HR TAKE 1 TABLET BY MOUTH EVERY DAY 90 Tablet 1    spironolactone (ALDACTONE) 25 MG Tab TAKE 3 TABLETS BY MOUTH EVERY DAY 90 Tablet 1     No current facility-administered medications for this visit.       Allergies:  Allergies   Allergen Reactions    Seasonal        Allergies, past medical history, past surgical history, family history, social history reviewed and updated.    Objective:    /76 (BP Location: Left arm, Patient Position: Sitting, BP Cuff Size: Adult)   Pulse (!) 106   Temp 35.9 °C (96.6 °F) (Temporal)   Resp 16   Ht 1.727 m (5' 8\")   Wt 65 kg (143 lb 4.8 oz)   SpO2 96%   BMI 21.79 kg/m²    Body mass index is 21.79 kg/m².    Physical exam:  General: Normal appearance, no acute distress, not ill-appearing  HEENT: EOM intact, conjunctiva normal limits, negative right/left eye discharge.  Sclera anicteric  Cardiovascular: Normal rate and rhythm, no murmurs  Pulmonary: No respiratory distress, no wheezing, no rales, breath sounds normal.  Musculoskeletal: No edema bilaterally  Skin: Warm, dry, no lesions  Neurological: No focal deficits, normal gait  Psychiatric: Mood within normal limits    Assessment/Plan:    Problem List Items Addressed This Visit       Primary hypertension     We will stick with current regimen metoprolol 100 mg daily, Aldactone 25 mg daily no blood pressures at home seem to be borderline high range.    I suggest we work on smoking " cessation, she is open to trying Wellbutrin    Return to care 6 weeks for blood pressure check with machine to calibrate with ours         Smoker     Rx Wellbutrin  Reports that Chantix had significant side effects in the past, nicotine patches never work for her             Return in about 6 weeks (around 9/1/2023).

## 2023-07-21 NOTE — LETTER
[facility]  Merlin Cadet D.O.  [address]  [address]  [place] [address]  Fax: [phone]   Authorization for Release/Disclosure of   Protected Health Information   Name: KARINA HERRERA : 1985 SSN: [id number]   [place] [address] Phone:    [phone] (home)    I authorize the entity listed below to release/disclose the PHI below to:   [facility]/Merlin Cadet D.O. and Merlin Cadet D.O.   Provider or Entity Name:  [facility]   Address   City, State, [facility]   Phone:      Fax:     Reason for request: continuity of care   Information to be released:    [  ] LAST COLONOSCOPY,  including any PATH REPORT and follow-up  [  ] LAST FIT/COLOGUARD RESULT [  ] LAST DEXA  [  ] LAST MAMMOGRAM  [  ] LAST PAP  [  ] LAST LABS [  ] RETINA EXAM REPORT  [  ] IMMUNIZATION RECORDS  [ x ] Release all info      [  ] Check here and initial the line next to each item to release ALL health information INCLUDING  _____ Care and treatment for drug and / or alcohol abuse  _____ HIV testing, infection status, or AIDS  _____ Genetic Testing    DATES OF SERVICE OR TIME PERIOD TO BE DISCLOSED: _____________  I understand and acknowledge that:  * This Authorization may be revoked at any time by you in writing, except if your health information has already been used or disclosed.  * Your health information that will be used or disclosed as a result of you signing this authorization could be re-disclosed by the recipient. If this occurs, your re-disclosed health information may no longer be protected by State or Federal laws.  * You may refuse to sign this Authorization. Your refusal will not affect your ability to obtain treatment.  * This Authorization becomes effective upon signing and will  on (date) __________.      If no date is indicated, this Authorization will  one (1) year from the signature date.    Name: Karina Herrera  Signature: Date:   2023      PLEASE FAX REQUESTED RECORDS BACK TO: [phone]

## 2023-08-17 DIAGNOSIS — L70.9 ACNE, UNSPECIFIED ACNE TYPE: ICD-10-CM

## 2023-08-17 DIAGNOSIS — I10 PRIMARY HYPERTENSION: ICD-10-CM

## 2023-08-17 RX ORDER — SPIRONOLACTONE 25 MG/1
75 TABLET ORAL DAILY
Qty: 90 TABLET | Refills: 1 | Status: SHIPPED | OUTPATIENT
Start: 2023-08-17 | End: 2023-09-20

## 2023-09-01 ENCOUNTER — OFFICE VISIT (OUTPATIENT)
Dept: MEDICAL GROUP | Facility: PHYSICIAN GROUP | Age: 38
End: 2023-09-01
Payer: COMMERCIAL

## 2023-09-01 VITALS
RESPIRATION RATE: 16 BRPM | SYSTOLIC BLOOD PRESSURE: 120 MMHG | DIASTOLIC BLOOD PRESSURE: 76 MMHG | WEIGHT: 147.05 LBS | TEMPERATURE: 97 F | HEART RATE: 86 BPM | OXYGEN SATURATION: 97 % | HEIGHT: 68 IN | BODY MASS INDEX: 22.29 KG/M2

## 2023-09-01 DIAGNOSIS — F17.200 SMOKER: ICD-10-CM

## 2023-09-01 DIAGNOSIS — Z23 NEED FOR VACCINATION: ICD-10-CM

## 2023-09-01 PROCEDURE — 90677 PCV20 VACCINE IM: CPT | Performed by: STUDENT IN AN ORGANIZED HEALTH CARE EDUCATION/TRAINING PROGRAM

## 2023-09-01 PROCEDURE — 3078F DIAST BP <80 MM HG: CPT | Performed by: STUDENT IN AN ORGANIZED HEALTH CARE EDUCATION/TRAINING PROGRAM

## 2023-09-01 PROCEDURE — 99213 OFFICE O/P EST LOW 20 MIN: CPT | Mod: 25 | Performed by: STUDENT IN AN ORGANIZED HEALTH CARE EDUCATION/TRAINING PROGRAM

## 2023-09-01 PROCEDURE — 3074F SYST BP LT 130 MM HG: CPT | Performed by: STUDENT IN AN ORGANIZED HEALTH CARE EDUCATION/TRAINING PROGRAM

## 2023-09-01 PROCEDURE — 90471 IMMUNIZATION ADMIN: CPT | Performed by: STUDENT IN AN ORGANIZED HEALTH CARE EDUCATION/TRAINING PROGRAM

## 2023-09-01 RX ORDER — NICOTINE 21 MG/24HR
1 PATCH, TRANSDERMAL 24 HOURS TRANSDERMAL EVERY 24 HOURS
Qty: 30 PATCH | Refills: 0 | Status: SHIPPED | OUTPATIENT
Start: 2023-09-01 | End: 2023-10-01

## 2023-09-01 NOTE — PROGRESS NOTES
HISTORY OF PRESENT ILLNESS: Karina is a pleasant 38 y.o. female, established patient who presents today to discuss medical problems as listed below:    Problem   Smoker    25 years of 0.5 pack a day. Had chantix in the past but had side effects.  She was recently placed on Wellbutrin however she had severe anxiety with this medication and discontinued.  She would like to  Arthritis.  She is open to trying nicotine patches to help her with smoking.  She has surgery coming up in October and would like to quit before then.          Current Outpatient Medications Ordered in Epic   Medication Sig Dispense Refill    nicotine (NICODERM) 14 MG/24HR PATCH 24 HR Place 1 Patch on the skin every 24 hours for 30 days. 14mg patch daily for 4 weeks, then 7mg patch daily for 2 weeks. Use nicotine gum as needed for cravings. 30 Patch 0    nicotine (NICODERM) 7 MG/24HR PATCH 24 HR Place 1 Patch on the skin every 24 hours for 14 days. 14mg patch daily for 4 weeks, then 7mg patch daily for 2 weeks. Use nicotine gum as needed for cravings. 14 Patch 0    nicotine polacrilex (NICORETTE) 2 MG Gum Take 1 Each by mouth every four hours as needed for Smoking Cessation (every 4 hours as need for cravings). 50 Each 1    spironolactone (ALDACTONE) 25 MG Tab TAKE 3 TABLETS BY MOUTH EVERY DAY 90 Tablet 1    metoprolol SR (TOPROL XL) 25 MG TABLET SR 24 HR TAKE 1 TABLET BY MOUTH EVERY DAY 90 Tablet 1     No current Epic-ordered facility-administered medications on file.       Review of systems:  Per HPI    Patient Active Problem List    Diagnosis Date Noted    Primary hypertension 03/09/2023    Smoker 03/09/2023    Irregular menstrual cycle 03/09/2023    Health care maintenance 03/09/2023    Sinus congestion 03/09/2023     History reviewed. No pertinent surgical history.  Social History     Tobacco Use    Smoking status: Every Day     Current packs/day: 0.50     Average packs/day: 0.5 packs/day for 25.0 years (12.5 ttl pk-yrs)     Types:  "Cigarettes    Smokeless tobacco: Never   Vaping Use    Vaping Use: Never used   Substance Use Topics    Alcohol use: Yes     Alcohol/week: 3.0 - 3.6 oz     Types: 5 - 6 Standard drinks or equivalent per week    Drug use: Never      Family History   Problem Relation Age of Onset    Hypertension Mother     Heart Disease Father     Heart Disease Maternal Grandmother     Heart Disease Maternal Grandfather      Current Outpatient Medications   Medication Sig Dispense Refill    nicotine (NICODERM) 14 MG/24HR PATCH 24 HR Place 1 Patch on the skin every 24 hours for 30 days. 14mg patch daily for 4 weeks, then 7mg patch daily for 2 weeks. Use nicotine gum as needed for cravings. 30 Patch 0    nicotine (NICODERM) 7 MG/24HR PATCH 24 HR Place 1 Patch on the skin every 24 hours for 14 days. 14mg patch daily for 4 weeks, then 7mg patch daily for 2 weeks. Use nicotine gum as needed for cravings. 14 Patch 0    nicotine polacrilex (NICORETTE) 2 MG Gum Take 1 Each by mouth every four hours as needed for Smoking Cessation (every 4 hours as need for cravings). 50 Each 1    spironolactone (ALDACTONE) 25 MG Tab TAKE 3 TABLETS BY MOUTH EVERY DAY 90 Tablet 1    metoprolol SR (TOPROL XL) 25 MG TABLET SR 24 HR TAKE 1 TABLET BY MOUTH EVERY DAY 90 Tablet 1     No current facility-administered medications for this visit.       Allergies:  Allergies   Allergen Reactions    Seasonal        Allergies, past medical history, past surgical history, family history, social history reviewed and updated.    Objective:    /76 (BP Location: Left arm, Patient Position: Sitting, BP Cuff Size: Adult)   Pulse 86   Temp 36.1 °C (97 °F) (Temporal)   Resp 16   Ht 1.727 m (5' 8\")   Wt 66.7 kg (147 lb 0.8 oz)   SpO2 97%   BMI 22.36 kg/m²    Body mass index is 22.36 kg/m².    Physical exam:  General: Normal appearance, no acute distress, not ill-appearing  HEENT: EOM intact, conjunctiva normal limits, negative right/left eye discharge.  Sclera " anicteric  Cardiovascular: Normal rate and rhythm, no murmurs  Pulmonary: No respiratory distress, no wheezing, no rales, breath sounds normal.  Musculoskeletal: No edema bilaterally  Skin: Warm, dry, no lesions  Neurological: No focal deficits, normal gait  Psychiatric: Mood within normal limits    Assessment/Plan:    Problem List Items Addressed This Visit       Smoker     DC Wellbutrin  Nicotine patch with gum for increased cravings           Relevant Medications    nicotine (NICODERM) 14 MG/24HR PATCH 24 HR    nicotine (NICODERM) 7 MG/24HR PATCH 24 HR    nicotine polacrilex (NICORETTE) 2 MG Gum    Other Relevant Orders    Pneumococcal Conjugate Vaccine 20-Valent (19 yrs+) (Completed)     Other Visit Diagnoses       Need for vaccination        Relevant Orders    Pneumococcal Conjugate Vaccine 20-Valent (19 yrs+) (Completed)             Return in about 6 months (around 3/1/2024), or if symptoms worsen or fail to improve, for blood pressure.

## 2023-09-20 DIAGNOSIS — I10 PRIMARY HYPERTENSION: ICD-10-CM

## 2023-09-20 DIAGNOSIS — L70.9 ACNE, UNSPECIFIED ACNE TYPE: ICD-10-CM

## 2023-09-20 RX ORDER — SPIRONOLACTONE 25 MG/1
75 TABLET ORAL DAILY
Qty: 90 TABLET | Refills: 1 | Status: SHIPPED | OUTPATIENT
Start: 2023-09-20 | End: 2023-10-24

## 2023-10-23 DIAGNOSIS — I10 PRIMARY HYPERTENSION: ICD-10-CM

## 2023-10-23 DIAGNOSIS — L70.9 ACNE, UNSPECIFIED ACNE TYPE: ICD-10-CM

## 2023-10-23 NOTE — TELEPHONE ENCOUNTER
Received request via: Pharmacy    Was the patient seen in the last year in this department? Yes    Does the patient have an active prescription (recently filled or refills available) for medication(s) requested? No    Does the patient have skilled nursing Plus and need 100 day supply (blood pressure, diabetes and cholesterol meds only)? Patient does not have SCP    Last office visit 09/01/23  Last labs 10/11/23

## 2023-10-24 RX ORDER — SPIRONOLACTONE 25 MG/1
75 TABLET ORAL DAILY
Qty: 270 TABLET | Refills: 1 | Status: SHIPPED | OUTPATIENT
Start: 2023-10-24

## 2024-03-08 ENCOUNTER — OFFICE VISIT (OUTPATIENT)
Dept: MEDICAL GROUP | Facility: PHYSICIAN GROUP | Age: 39
End: 2024-03-08
Payer: COMMERCIAL

## 2024-03-08 VITALS
HEIGHT: 68 IN | HEART RATE: 97 BPM | DIASTOLIC BLOOD PRESSURE: 84 MMHG | BODY MASS INDEX: 23.76 KG/M2 | TEMPERATURE: 96.5 F | SYSTOLIC BLOOD PRESSURE: 122 MMHG | RESPIRATION RATE: 16 BRPM | WEIGHT: 156.75 LBS | OXYGEN SATURATION: 98 %

## 2024-03-08 DIAGNOSIS — Z85.41 HX OF CERVICAL CANCER: ICD-10-CM

## 2024-03-08 DIAGNOSIS — Z23 NEED FOR VACCINATION: ICD-10-CM

## 2024-03-08 PROBLEM — N92.6 IRREGULAR MENSTRUAL CYCLE: Status: RESOLVED | Noted: 2023-03-09 | Resolved: 2024-03-08

## 2024-03-08 PROCEDURE — 90472 IMMUNIZATION ADMIN EACH ADD: CPT | Performed by: STUDENT IN AN ORGANIZED HEALTH CARE EDUCATION/TRAINING PROGRAM

## 2024-03-08 PROCEDURE — 90471 IMMUNIZATION ADMIN: CPT | Performed by: STUDENT IN AN ORGANIZED HEALTH CARE EDUCATION/TRAINING PROGRAM

## 2024-03-08 PROCEDURE — 3079F DIAST BP 80-89 MM HG: CPT | Performed by: STUDENT IN AN ORGANIZED HEALTH CARE EDUCATION/TRAINING PROGRAM

## 2024-03-08 PROCEDURE — 90746 HEPB VACCINE 3 DOSE ADULT IM: CPT | Performed by: STUDENT IN AN ORGANIZED HEALTH CARE EDUCATION/TRAINING PROGRAM

## 2024-03-08 PROCEDURE — 99395 PREV VISIT EST AGE 18-39: CPT | Mod: 25 | Performed by: STUDENT IN AN ORGANIZED HEALTH CARE EDUCATION/TRAINING PROGRAM

## 2024-03-08 PROCEDURE — 90686 IIV4 VACC NO PRSV 0.5 ML IM: CPT | Performed by: STUDENT IN AN ORGANIZED HEALTH CARE EDUCATION/TRAINING PROGRAM

## 2024-03-08 PROCEDURE — 3074F SYST BP LT 130 MM HG: CPT | Performed by: STUDENT IN AN ORGANIZED HEALTH CARE EDUCATION/TRAINING PROGRAM

## 2024-03-08 ASSESSMENT — ENCOUNTER SYMPTOMS
VOMITING: 0
WHEEZING: 0
NAUSEA: 0
CHILLS: 0
DIZZINESS: 0
HEADACHES: 0
SHORTNESS OF BREATH: 0
NERVOUS/ANXIOUS: 1
ORTHOPNEA: 0
FOCAL WEAKNESS: 0
FEVER: 0
DEPRESSION: 0
BLOOD IN STOOL: 0
ABDOMINAL PAIN: 0
PALPITATIONS: 0
HEARTBURN: 0
COUGH: 0

## 2024-03-08 ASSESSMENT — FIBROSIS 4 INDEX: FIB4 SCORE: 0.49

## 2024-03-08 ASSESSMENT — PATIENT HEALTH QUESTIONNAIRE - PHQ9: CLINICAL INTERPRETATION OF PHQ2 SCORE: 0

## 2024-03-08 NOTE — PROGRESS NOTES
Subjective:     CC:   Chief Complaint   Patient presents with    Follow-Up       HPI:   Karina Deleon is a 38 y.o. female who presents for annual exam. She is feeling well and denies any complaints.    History of Present Illness  The patient is a 38-year-old female who is here for an annual    She has not been good at keeping track of her blood pressure, but every time she has gone for surgical procedures, they check it and it was always perfect. She is taking metoprolol and spironolactone. She denies any chest pain, shortness of breath, dizziness, headaches, or swelling in her legs.    She has not been exercising since her surgery because she could not lift anything. She can walk up a flight of stairs without any shortness of breath.    She has not been sleeping well. She used to work with  [employer] for 10 years and used to go in to work at 2:30 in the morning every day. She wakes up in the middle of the night every night for a couple of hours and then goes back to sleep. She gets a full 6 to 8 hours of sleep. She uses her phone when she wakes up. She has racing thoughts at night.       She continues to smoke about half a pack of cigarettes a day. She started smoking when she was 14 years old. Nicotine gum did not work for her. Her  smokes too. She is motivated to quit smoking sometimes, but sometimes it is not a good time in her life because she is too stressed out.   She has tried Chantix in the past, which worked, but she had every single bad side effect. She tried Wellbutrin, but she immediately got high anxiety side effects.     Healthcare main    She had a hysterectomy for cervical cancer, which was attributed to smoking. She gets regular cervical and pelvic exams at [facility]. she denies any pregnancies. She denies any recreational drug use.    She denies any family history of colon cancer, breast, or ovarian cancer. She has a family history of heart issues.   She would like to get  the influenza vaccine.She has never had her vision checked. She does not want STD or HIV testing done.        Ob-Gyn/ History:    Patient has GYN provider: yes  Gynecologist: Anthony smith  /Para:    Gyn Surgery:  hysterectomy due to cervical cancer    Health Maintenance    Diet and Exercise:   Substance Abuse: no concerns   Safe in relationship  Seat belts, bike helmet, gun safety discussed  Sun protection used.    Cancer screening  Colorectal Cancer Screening: no fam hx    Cervical Cancer Screening: hysterectomy    Breast Cancer Screening: no fam hx     Infectious disease screening/Immunizations  --STI Screening: dec   --Practices safe sex.  --HIV Screening: dec     --Immunizations:    Hep b and influenza today    She  has no past medical history on file.  She  has no past surgical history on file.    Family History   Problem Relation Age of Onset    Hypertension Mother     Heart Disease Father     Heart Disease Maternal Grandmother     Heart Disease Maternal Grandfather        Social History     Socioeconomic History    Marital status:      Spouse name: Not on file    Number of children: Not on file    Years of education: Not on file    Highest education level: Not on file   Occupational History    Not on file   Tobacco Use    Smoking status: Every Day     Current packs/day: 0.50     Average packs/day: 0.5 packs/day for 25.0 years (12.5 ttl pk-yrs)     Types: Cigarettes    Smokeless tobacco: Never   Vaping Use    Vaping Use: Never used   Substance and Sexual Activity    Alcohol use: Yes     Alcohol/week: 3.0 - 3.6 oz     Types: 5 - 6 Standard drinks or equivalent per week    Drug use: Never    Sexual activity: Yes     Partners: Male     Birth control/protection: None   Other Topics Concern    Not on file   Social History Narrative    ** Merged History Encounter **          Social Determinants of Health     Financial Resource Strain: Not on file   Food Insecurity: Not on file  "  Transportation Needs: Not on file   Physical Activity: Not on file   Stress: Not on file   Social Connections: Not on file   Intimate Partner Violence: Not on file   Housing Stability: Not on file       Patient Active Problem List    Diagnosis Date Noted    Hx of cervical cancer 03/08/2024    Primary hypertension 03/09/2023    Smoker 03/09/2023    Health care maintenance 03/09/2023    Sinus congestion 03/09/2023         Current Outpatient Medications   Medication Sig Dispense Refill    metoprolol SR (TOPROL XL) 25 MG TABLET SR 24 HR TAKE 1 TABLET BY MOUTH EVERY DAY 90 Tablet 1    spironolactone (ALDACTONE) 25 MG Tab TAKE 3 TABLETS BY MOUTH EVERY  Tablet 1     No current facility-administered medications for this visit.     Allergies   Allergen Reactions    Seasonal        Review of Systems   Constitutional:  Negative for chills and fever.   Respiratory:  Negative for cough, shortness of breath and wheezing.    Cardiovascular:  Negative for chest pain, palpitations, orthopnea and leg swelling.   Gastrointestinal:  Negative for abdominal pain, blood in stool, heartburn, melena, nausea and vomiting.   Genitourinary:  Negative for dysuria.   Musculoskeletal:  Negative for joint pain.   Neurological:  Negative for dizziness, focal weakness and headaches.   Psychiatric/Behavioral:  Negative for depression. The patient is nervous/anxious.         Objective:     /84   Pulse 97   Temp 35.8 °C (96.5 °F) (Temporal)   Resp 16   Ht 1.727 m (5' 8\")   Wt 71.1 kg (156 lb 12 oz)   SpO2 98%   BMI 23.83 kg/m²   Body mass index is 23.83 kg/m².  Wt Readings from Last 4 Encounters:   03/08/24 71.1 kg (156 lb 12 oz)   09/01/23 66.7 kg (147 lb 0.8 oz)   07/21/23 65 kg (143 lb 4.8 oz)   04/28/23 61 kg (134 lb 7.7 oz)       Physical Exam  Vitals reviewed.   Constitutional:       General: She is not in acute distress.     Appearance: Normal appearance. She is not ill-appearing or toxic-appearing.   HENT:      Head: " Normocephalic and atraumatic.   Eyes:      General: No scleral icterus.        Right eye: No discharge.         Left eye: No discharge.      Conjunctiva/sclera: Conjunctivae normal.   Neck:      Vascular: No carotid bruit.   Cardiovascular:      Rate and Rhythm: Normal rate and regular rhythm.      Pulses: Normal pulses.      Heart sounds: Normal heart sounds. No murmur heard.  Pulmonary:      Effort: Pulmonary effort is normal. No respiratory distress.      Breath sounds: Normal breath sounds. No wheezing or rales.   Abdominal:      General: Abdomen is flat. Bowel sounds are normal.      Palpations: Abdomen is soft.   Musculoskeletal:      Cervical back: Neck supple.      Right lower leg: No edema.      Left lower leg: No edema.   Skin:     General: Skin is warm and dry.   Neurological:      General: No focal deficit present.      Mental Status: She is alert.   Psychiatric:         Mood and Affect: Mood normal.         Thought Content: Thought content normal.          Assessment and Plan:     Assessment & Plan  1. Hypertension.  Her blood pressure is well controlled. She will continue metoprolol and spironolactone.    2. Smoking cessation.  Not quite motivated to quit  Uses smoking for stress  Chantix had side effects but was effective in her past  Nicotine gum/patch not effective but she was not entirely motivated to quit    3. Health maintenance.  She will receive her hepatitis B and influenza vaccines today.  Cont follow up with gyn for hx of cervical cancer with yearly pelvic exams  No fam hx of colon cancer     Patient Counseling:  --Discussed moderation in caloric intake, sufficient fresh fruits/vegetables, fiber, iron  --Discussed brushing, flossing, and dental visits.   --Encouraged regular exercise.   --Discussed tobacco, alcohol, or other drug use.  --Discussed sexually transmitted infections, partner selection  --Injury prevention: Discussed       Follow-up  The patient will follow up in 6  months.      Follow-up: Return in about 6 months (around 9/8/2024) for blood pressure.

## 2024-04-18 DIAGNOSIS — L70.9 ACNE, UNSPECIFIED ACNE TYPE: ICD-10-CM

## 2024-04-18 DIAGNOSIS — I10 PRIMARY HYPERTENSION: ICD-10-CM

## 2024-04-18 RX ORDER — SPIRONOLACTONE 25 MG/1
75 TABLET ORAL DAILY
Qty: 270 TABLET | Refills: 1 | Status: SHIPPED | OUTPATIENT
Start: 2024-04-18

## 2024-04-18 NOTE — TELEPHONE ENCOUNTER
Received request via: Patient    Was the patient seen in the last year in this department? Yes    Does the patient have an active prescription (recently filled or refills available) for medication(s) requested? No    [facility]    Does the patient have detention Plus and need 100 day supply (blood pressure, diabetes and cholesterol meds only)? Patient does not have SCP

## 2024-06-11 ENCOUNTER — OFFICE VISIT (OUTPATIENT)
Dept: MEDICAL GROUP | Facility: PHYSICIAN GROUP | Age: 39
End: 2024-06-11
Payer: COMMERCIAL

## 2024-06-11 VITALS
DIASTOLIC BLOOD PRESSURE: 90 MMHG | BODY MASS INDEX: 23.59 KG/M2 | TEMPERATURE: 96.5 F | RESPIRATION RATE: 16 BRPM | HEIGHT: 68 IN | SYSTOLIC BLOOD PRESSURE: 136 MMHG | HEART RATE: 100 BPM | OXYGEN SATURATION: 98 % | WEIGHT: 155.65 LBS

## 2024-06-11 DIAGNOSIS — M27.2 ODONTOGENIC INFECTION OF JAW: ICD-10-CM

## 2024-06-11 PROCEDURE — 99214 OFFICE O/P EST MOD 30 MIN: CPT | Performed by: STUDENT IN AN ORGANIZED HEALTH CARE EDUCATION/TRAINING PROGRAM

## 2024-06-11 PROCEDURE — 3074F SYST BP LT 130 MM HG: CPT | Performed by: STUDENT IN AN ORGANIZED HEALTH CARE EDUCATION/TRAINING PROGRAM

## 2024-06-11 PROCEDURE — 3080F DIAST BP >= 90 MM HG: CPT | Performed by: STUDENT IN AN ORGANIZED HEALTH CARE EDUCATION/TRAINING PROGRAM

## 2024-06-11 RX ORDER — AMOXICILLIN AND CLAVULANATE POTASSIUM 875; 125 MG/1; MG/1
1 TABLET, FILM COATED ORAL 2 TIMES DAILY
Qty: 14 TABLET | Refills: 0 | Status: SHIPPED | OUTPATIENT
Start: 2024-06-11 | End: 2024-06-18

## 2024-06-11 RX ORDER — PREDNISONE 20 MG/1
40 TABLET ORAL DAILY
Qty: 10 TABLET | Refills: 0 | Status: SHIPPED | OUTPATIENT
Start: 2024-06-11 | End: 2024-06-16

## 2024-06-11 RX ORDER — HYDROCODONE BITARTRATE AND ACETAMINOPHEN 5; 325 MG/1; MG/1
TABLET ORAL
COMMUNITY
Start: 2023-10-11 | End: 2024-06-11

## 2024-06-11 ASSESSMENT — FIBROSIS 4 INDEX: FIB4 SCORE: 0.49

## 2024-06-11 NOTE — PROGRESS NOTES
Verbal Consent given for [employer] recording software    HISTORY OF PRESENT ILLNESS: Karina is a pleasant 38 y.o. female, established patient who presents today to discuss medical problems as listed below:    History of Present Illness  The patient is a 38-year-old female here for left-sided facial swelling.    The patient reports the onset of left-sided facial pain on Saturday, followed by the onset of swelling on Sunday. The severity of the pain has slightly improved today, however, she notes a worsening of the swelling compared to the previous day. She denies the presence of fever. The pain is localized to her tooth, and she has scheduled a dental appointment for the upcoming Monday. She reports no difficulty in swallowing or breathing. She has been self-medicating with ibuprofen 800 mg, as recommended by her dentist, which provides relief for approximately 3 hours. She recalls a similar episode in the past, for which she sought medical attention at an urgent care facility, where she was prescribed an antibiotic and a steroid. The patient describes the tooth pain as the most severe she has ever experienced. She typically takes 2 Advil and 2 Tylenol for pain management.    Supplemental Information  She is taking metoprolol and Aldactone 75 mg daily in the morning.   She is not allergic to any antibiotics.       Current Outpatient Medications Ordered in Epic   Medication Sig Dispense Refill    metoprolol tartrate (LOPRESSOR) 25 MG Tab 1 tablet with food Orally Twice a day      amoxicillin-clavulanate (AUGMENTIN) 875-125 MG Tab Take 1 Tablet by mouth 2 times a day for 7 days. 14 Tablet 0    predniSONE (DELTASONE) 20 MG Tab Take 2 Tablets by mouth every day for 5 days. 10 Tablet 0    spironolactone (ALDACTONE) 25 MG Tab TAKE 3 TABLETS BY MOUTH EVERY  Tablet 1     No current Epic-ordered facility-administered medications on file.       Review of systems:  Per HPI    Patient Active Problem List    Diagnosis Date  "Noted    Hx of cervical cancer 03/08/2024    Primary hypertension 03/09/2023    Smoker 03/09/2023    Health care maintenance 03/09/2023    Sinus congestion 03/09/2023     History reviewed. No pertinent surgical history.  Social History     Tobacco Use    Smoking status: Every Day     Current packs/day: 0.50     Average packs/day: 0.5 packs/day for 25.0 years (12.5 ttl pk-yrs)     Types: Cigarettes    Smokeless tobacco: Never   Vaping Use    Vaping status: Never Used   Substance Use Topics    Alcohol use: Yes     Alcohol/week: 3.0 - 3.6 oz     Types: 5 - 6 Standard drinks or equivalent per week    Drug use: Never      Family History   Problem Relation Age of Onset    Hypertension Mother     Heart Disease Father     Heart Disease Maternal Grandmother     Heart Disease Maternal Grandfather      Current Outpatient Medications   Medication Sig Dispense Refill    metoprolol tartrate (LOPRESSOR) 25 MG Tab 1 tablet with food Orally Twice a day      amoxicillin-clavulanate (AUGMENTIN) 875-125 MG Tab Take 1 Tablet by mouth 2 times a day for 7 days. 14 Tablet 0    predniSONE (DELTASONE) 20 MG Tab Take 2 Tablets by mouth every day for 5 days. 10 Tablet 0    spironolactone (ALDACTONE) 25 MG Tab TAKE 3 TABLETS BY MOUTH EVERY  Tablet 1     No current facility-administered medications for this visit.       Allergies:  Allergies   Allergen Reactions    Seasonal        Allergies, past medical history, past surgical history, family history, social history reviewed and updated.    Objective:    BP (!) 136/90   Pulse 100   Temp 35.8 °C (96.5 °F) (Temporal)   Resp 16   Ht 1.727 m (5' 8\")   Wt 70.6 kg (155 lb 10.3 oz)   SpO2 98%   BMI 23.67 kg/m²    Body mass index is 23.67 kg/m².    Physical exam:  General: Normal appearance, no acute distress, not ill-appearing  HEENT: EOM intact, conjunctiva normal limits, negative right/left eye discharge.  Sclera anicteric, oropharynx no erythema or swelling, L lower 3rd molar with " swelling, erthema.   Cardiovascular: Normal rate and rhythm, no murmurs  Pulmonary: No respiratory distress, no wheezing, no rales, breath sounds normal.  Musculoskeletal: No edema bilaterally  Skin: Warm, dry, no lesions  Neurological: No focal deficits, normal gait  Psychiatric: Mood within normal limits    Assessment/Plan:    Assessment & Plan  1. Dental infection   A prescription for Augmentin has been issued, along with a 5-day course of steroids.  Oropharynx is clear, she is swallowing and breathing well. ER precautions given   Follow up with dentist in 7 days for scheduled appt         Problem List Items Addressed This Visit    None  Visit Diagnoses       Odontogenic infection of jaw        Relevant Medications    amoxicillin-clavulanate (AUGMENTIN) 875-125 MG Tab    predniSONE (DELTASONE) 20 MG Tab            Return in about 3 months (around 9/11/2024), or if symptoms worsen or fail to improve, for blood pressure.

## 2024-08-10 ENCOUNTER — PATIENT MESSAGE (OUTPATIENT)
Dept: MEDICAL GROUP | Facility: PHYSICIAN GROUP | Age: 39
End: 2024-08-10
Payer: COMMERCIAL

## 2024-08-12 NOTE — PATIENT COMMUNICATION
Received request via: Patient    Was the patient seen in the last year in this department? Yes    Does the patient have an active prescription (recently filled or refills available) for medication(s) requested? No    [facility]     Does the patient have nursing home Plus and need 100-day supply? (This applies to ALL medications) Patient does not have SCP

## 2024-08-13 RX ORDER — METOPROLOL TARTRATE 25 MG/1
TABLET, FILM COATED ORAL
Qty: 180 TABLET | Refills: 2 | Status: SHIPPED | OUTPATIENT
Start: 2024-08-13

## 2024-10-12 DIAGNOSIS — I10 PRIMARY HYPERTENSION: ICD-10-CM

## 2024-10-12 DIAGNOSIS — L70.9 ACNE, UNSPECIFIED ACNE TYPE: ICD-10-CM

## 2024-10-15 RX ORDER — SPIRONOLACTONE 25 MG/1
75 TABLET ORAL DAILY
Qty: 270 TABLET | Refills: 1 | Status: SHIPPED | OUTPATIENT
Start: 2024-10-15

## 2025-02-05 DIAGNOSIS — I10 PRIMARY HYPERTENSION: ICD-10-CM

## 2025-02-05 DIAGNOSIS — L70.9 ACNE, UNSPECIFIED ACNE TYPE: ICD-10-CM

## 2025-02-06 RX ORDER — SPIRONOLACTONE 25 MG/1
75 TABLET ORAL DAILY
Qty: 270 TABLET | Refills: 1 | Status: SHIPPED | OUTPATIENT
Start: 2025-02-06

## 2025-05-12 RX ORDER — METOPROLOL TARTRATE 25 MG/1
TABLET, FILM COATED ORAL
Qty: 180 TABLET | Refills: 2 | Status: SHIPPED | OUTPATIENT
Start: 2025-05-12